# Patient Record
Sex: FEMALE | Race: WHITE
[De-identification: names, ages, dates, MRNs, and addresses within clinical notes are randomized per-mention and may not be internally consistent; named-entity substitution may affect disease eponyms.]

---

## 2021-03-05 ENCOUNTER — HOSPITAL ENCOUNTER (OUTPATIENT)
Dept: HOSPITAL 47 - WWCWWP | Age: 71
End: 2021-03-05
Attending: SURGERY
Payer: MEDICARE

## 2021-03-05 VITALS
RESPIRATION RATE: 16 BRPM | DIASTOLIC BLOOD PRESSURE: 71 MMHG | SYSTOLIC BLOOD PRESSURE: 139 MMHG | HEART RATE: 74 BPM | TEMPERATURE: 98.2 F

## 2021-03-05 DIAGNOSIS — Z17.0: ICD-10-CM

## 2021-03-05 DIAGNOSIS — C50.912: Primary | ICD-10-CM

## 2021-03-05 DIAGNOSIS — Z80.9: ICD-10-CM

## 2021-03-05 NOTE — P.GSHP
History of Present Illness


H&P Date: 03/05/21


Chief Complaint: left breast cancer


    


   Lizzy is a 70 year old white female seen in consultation for DR. Pina 

regarding a left breast cancer.  She felt at lump in her left breast about two 

months ago.  She had a bilateral mammogram on 2-3-21 and this showed a 1.2 Cm 

left breast mass in the inner upper aspect.  An ultrasound was done on dhara same 

date which showed a 2.5 cm mass at 10 O Clock.  A biopsy was done on 2-23-21 

which was an invasive ductal carcinoma, ER/NY positive, HER-2 negative.


    It has not changed in size since she noted it.  In 2017 she had a stero 

biopsy done of the left breast which was negative for cancer.  She also had a 

left breast open biopsy in her 30's which was negative for cancer. She has had 

no surgery on her right breast.  Prior to the biopsy she had some minimal 

discomfort at the site of the nodularity, since the biopsy she has some 

ecchymosis in the breast.  She did have swelling also after the biopsy which has

decreased in size.  The biopsy was done and [id number].  Does not have any nipple 

discharge or skin changes.





Caffeine:none


nicotine: none


arron-bromine: occasional


hormones: none





Family History: 


mother: lung cancer smoker


paternal aunt: ? type


father: prostate cancer


brother: prostate ancer





Hormonal History:


menarche: 12


G0


menopause: 49


BCP: none


hormones: none








Surgical History:


bresat biopsy





Medical History:


shingles


bladder infection





Social History:


smoke: none


alcohol: none


drugs: none





 











- Constitutional


Constitutional: Denies chills, Denies fever





- EENT


Comment: 





wears glasses


Ears: bilateral: tinnitus


Ears, nose, mouth and throat: Denies headache, Denies sore throat





- Breasts


Breasts: bilateral: as per HPI





- Cardiovascular


Cardiovascular: Denies chest pain, Denies shortness of breath





- Respiratory


Respiratory: Denies cough, Denies 7





- Gastrointestinal


Gastrointestinal: Denies abdominal pain, Denies diarrhea, Denies nausea, Denies 

vomiting





- Genitourinary (Female)


Comment: 





UTI in recent past


Genitourinary: Denies dysuria, Denies hematuria





- Menstruation


Menstruation: Reports postmenopausal





- Musculoskeletal


Comment: 





arthritis in hands





- Integumentary


Integumentary: Denies pruritus, Denies rash





- Neurological


Neurological: Denies numbness, Denies weakness





- Psychiatric


Psychiatric: Reports anxiety





- Endocrine


Endocrine: Denies fatigue, Denies weight change





- Hematologic/Lymphatic


Comment: 





low dose aspirin and vitamin E





- Allergic/Immunologic


Allergic/Immunologic: Reports as per HPI





Medications and Allergies


                                Home Medications











 Medication  Instructions  Recorded  Confirmed  Type


 


Ascorbic Acid [Vitamin C] 1,000 mg PO BID 03/05/21 03/05/21 History


 


Aspirin [Adult Low Dose Aspirin EC] 81 mg PO QAM 03/05/21 03/05/21 History


 


Cholecalciferol [Vitamin D3 (25 25 mcg PO QAM 03/05/21 03/05/21 History





Mcg = 1000 Iu)]    


 


Cyanocobalamin/Cobamamide [Vitamin 1 each SL DAILY 03/05/21 03/05/21 History





B-12 5,000 Mcg Tab Sl]    


 


Vitamin E 1,000 unit PO QAM 03/05/21 03/05/21 History








                                    Allergies











Allergy/AdvReac Type Severity Reaction Status Date / Time


 


Sulfa (Sulfonamide Allergy  Abdominal Unverified 03/05/21 07:33





Antibiotics)   Pain  














Surgical - Exam





BMI 22.2





- General


well developed, well nourished, no distress





- Eyes


normal ocular movement





- ENT


normal pinna, normal nares





- Neck


no masses, trachea midline





- Respiratory


normal respiratory effort, clear to auscultation





- Cardiovascular


Rhythm: regular


Heart Sounds: normal: S1, S2





- Abdomen


Abdomen: soft, bowel sounds





- Integumentary





normal turgor





- Neurologic


no disoriented, no combative





- Musculoskeletal


normal gait





- Psychiatric


oriented to time, oriented to person, oriented to place, speech is normal, 

memory intact





breast exam:


BRA: 34B


inspection: bilateral grade 2/3 ptosis


palpation: 


Right breast: Multiple positional exam dense fibroglandular tissue fibrocystic 

disease no dominant masses or nodules of concern


Right axilla: No adenopathy of concern


Left breast: Multiple positional exam increased fullness at the 10 o'clock 

position approximately 2 cm in size, ecchymosis at site of biopsy, no evidence 

of infection, fibrocystic changes, no dominant masses or nodules of concern


Left axilla: No adenopathy of concern





Results





Mammogram and ultrasound xrays examined with DR. Burleson





REview of medical records from Dr. Pina





Assessment and Plan


Assessment: 





Impression:


1.  [id number]+Her2-G2 left breast cancer


2.  Very dense bilateral breast on mammogram


3.  Abnormal left breast ultrasound


4.  Family history of cancer





Plan:


1. Breast MRI secondary to dense breast and unable to see lesion on mammogram in

the left breast


2.  presentation of case at tumor board


3. appointment with medical oncology


4.  follow up after MRI





CC: Dr. Pina





We have discussed the stage of the tumor which is a 1B, we have discussed 

treatment options of both the breast and the axilla.  At this time the patient 

would prefer to have a lumpectomy if possible.  She will be seen again following

breast MRI and presentation of her case at tumor board.  If possible I would 

like medical oncology to shrink the tumor prior to a lumpectomy secondary to the

size of the tumor relative to her breast.





Encounter 60 minutes, time spent in examination, review of records, and 

counselling.

## 2021-03-08 ENCOUNTER — HOSPITAL ENCOUNTER (OUTPATIENT)
Dept: HOSPITAL 47 - RADMRIMAIN | Age: 71
Discharge: HOME | End: 2021-03-08
Attending: SURGERY
Payer: MEDICARE

## 2021-03-08 DIAGNOSIS — C50.212: Primary | ICD-10-CM

## 2021-03-08 PROCEDURE — 77049 MRI BREAST C-+ W/CAD BI: CPT

## 2021-03-09 NOTE — BMR
EXAMINATION TYPE: MR breast BILAT wo/w con

 

DATE OF EXAM: 3/8/2021

 

COMPARISON: Outside bilateral diagnostic 3-D mammogram February 3, 2021 BI-RADS 0. Outside left breas
t ultrasound February 3, 2021 BI-RADS 4.

 

HISTORY: Upper-inner quadrant of LT side-lump/mass, invasive ductal carcinoma on ultrasound-guided bi
opsy February 16, 2021.

 

TECHNIQUE: A series of fat and water weighted images in the long and short axis views of both breasts
 are obtained in conjunction with dynamic contrast MRI with subtraction technique.  The patient was i
njected with 5.0 mL intravenous Gadavist gadolinium contrast.   Three-dimensional and additional post
processing imaging is created on independent workstation and reviewed during official interpretation 
of this study.  

 

 

FINDINGS: Extremely dense fibroglandular tissue is redemonstrated throughout both breasts. T2 and STI
R weighted images show no significant cystic change in either breast. There are symmetric prominent b
ut subcentimeter benign-appearing bilateral axillary lymph nodes present on T1 nonfat saturated image
s. Dynamic postcontrast imaging shows mild background enhancement. Delayed dynamic postcontrast imagi
ng shows no suspicious internal mammary adenopathy.

 

With regards to the right breast no suspicious skin thickening is seen. No pathologic enhancement or 
enhancing masses noted. The chest wall is intact.

 

With regards to the left breast corresponding to mammogram and ultrasound and palpable abnormality th
ere is 1.4 x 1.1 x 1.7 cm enhancing lesion subtraction series 701 image 428 and sagittal postcontrast
 series 704 image 273 at 10:00 position posterior depth with artifact from biopsy clip along the medi
al anterior aspect that corresponds to recent biopsy-proven invasive ductal carcinoma. Computer measu
rements 1.6 x 1.4 x 2.0 cm correlates slightly more accurate with recent ultrasound. 

 

There are some punctate areas of susceptibility artifact in the anterior outer aspect of left breast 
near nipple. There is additional more focal area of susceptibility artifact between 11 and 12:00 posi
tion middle depth upper aspect left breast image 523 series 701. Dynamic postcontrast imaging shows s
ome heterogeneous vague enhancement at this level. No definitive measurable lesion. More rim type enh
ancement seen on delayed images measuring 1.2 x 0.9 cm image 21 series 801. I do not see correspondin
g large dystrophic calcification on mammogram. Area of uncertain clinical significance. I would advis
e targeted ultrasound evaluation, if negative would presume benign.

 

No abnormal skin thickening is present. Chest wall is intact.

 

Small size hiatal hernia is incidentally appreciated.

 

IMPRESSION: Biopsy-proven malignancy left breast. Second area of concern left breast as noted above w
arrants ultrasound evaluation. No MRI evidence for invasive malignancy right breast. No abnormal johnny
opathy clearly identified.

 

BI-RADS 2 benign findings right breast

BI-RADS 6 biopsy-proven cancer left breast.

 

Recommendation: Ultrasound evaluation B zone 11 to 12:00 position to rule out second lesion, if negat
markos can proceed with surgical management of single biopsy-proven malignancy left breast.

## 2021-03-19 ENCOUNTER — HOSPITAL ENCOUNTER (OUTPATIENT)
Dept: HOSPITAL 47 - RADUSWWP | Age: 71
Discharge: HOME | End: 2021-03-19
Attending: SURGERY
Payer: MEDICARE

## 2021-03-19 DIAGNOSIS — C50.212: Primary | ICD-10-CM

## 2021-03-22 NOTE — USB
Reason for exam: additional evaluation requested from prior study.



History:

Patient has history of breast cancer at age 70.

Malignant ultrasound-guided core biopsy of the left breast, February 2021.



Physical Findings:

Nurse Summary: 1.5cm firm, fixed lump left breast 10 o'clock, bruising left lower 

inner quadrant from biopsy (nurse mj).



US Breast Limited LT

Left limited breast ultrasound including focal area of concern, retroareolar and 

axilla demonstrates a 0.5 x 0.3 x 0.8cm oval, hypoechoic to anechoic lesion at 11 

o'clock and a 0.9 x 0.7 x 1.2cm oval, solid lesion at 11 o'clock. 10 o'clock 

biopsy proved cancer.



These results were verbally communicated with the patient and result sheet given 

to the patient on 3/19/21.





ASSESSMENT: Suspicious, BI-RAD 4



RECOMMENDATION:

Ultrasound core biopsy of the left breast.



Called office with mammographic findings and has scheduled an appointment for the 

patient for 4/16/21 at 11:20 with Dr. Berry.

Biopsy scheduled for 4/1/21 at 1:00.



PRELIMINARY REPORT CALLED AND FAXED TO DR. BERRY ON 3/22/21.

## 2021-04-01 ENCOUNTER — HOSPITAL ENCOUNTER (OUTPATIENT)
Dept: HOSPITAL 47 - RADUSWWP | Age: 71
End: 2021-04-01
Attending: SURGERY
Payer: MEDICARE

## 2021-04-01 VITALS — TEMPERATURE: 98.2 F | DIASTOLIC BLOOD PRESSURE: 68 MMHG | SYSTOLIC BLOOD PRESSURE: 137 MMHG | HEART RATE: 80 BPM

## 2021-04-01 VITALS — RESPIRATION RATE: 16 BRPM

## 2021-04-01 DIAGNOSIS — C50.412: Primary | ICD-10-CM

## 2021-04-01 DIAGNOSIS — Z17.0: ICD-10-CM

## 2021-04-01 PROCEDURE — 88341 IMHCHEM/IMCYTCHM EA ADD ANTB: CPT

## 2021-04-01 PROCEDURE — 77065 DX MAMMO INCL CAD UNI: CPT

## 2021-04-01 PROCEDURE — 88305 TISSUE EXAM BY PATHOLOGIST: CPT

## 2021-04-01 PROCEDURE — 88342 IMHCHEM/IMCYTCHM 1ST ANTB: CPT

## 2021-04-01 PROCEDURE — 19083 BX BREAST 1ST LESION US IMAG: CPT

## 2021-04-01 NOTE — USB
EXAMINATION TYPE: US biopsy breast VAD LT

 

DATE OF EXAM: 4/1/2021

 

CLINICAL HISTORY: C50.412 Malignant neoplasm of left breast.

 

TECHNIQUE: Ultrasound guided vaccuum assisted core biopsy of left breast.  

 

COMPARISON: 3/19/2021

 

FINDINGS: The ultrasound guided core biopsy procedure was explained to the 
patient.  The risks, benefits, alternatives were discussed.  An informed consent
was then obtained.  

 

Timeout was performed.

 

The patient was placed in supine positioning for  imaging and for the procedure.
The overlying skin was prepped with betadine and sterilely draped in usual 
sterile fashion.  Lidocaine 1% was used as anesthetic into the skin and deeper 
breast tissue up to area of concern in the breast.  A small skin nick was made 
with surgical scalpel.  

 

Under ultrasound guidance, a 12-gauge vacuum assisted biopsy device was used to 
obtain 4 core samples.  Celeros was utilized. A biopsy clip was left in lesion. 
Coil clip was placed.

 

Good hemostasis was obtained with direct pressure.  Discharge instructions were 
discussed with the patient.  The patient will follow up with the referring 
physician for results.

 

Postprocedure mammogram: The patient was transferred to mammography for 
physician ordered post procedure mammogram for clip placement verification.  The
clip is in the expected region of the biopsy.

 

The patient tolerated the procedure well without any immediate complication.  
The patient was discharged to home in stable condition.  

 

 

 

IMPRESSION: 

1. Successful ultrasound guided biopsy left breast. 

 

Recommendations:

1. Recommendations are pending pathology results.

 

Pathology Results: Malignant



LEFT BREAST, ELEVEN O'CLOCK, CORE BIOPSY:  Invasive ductal carcinoma, Grade 2 
(see Surgical Pathology Cancer Case Summary and comment).  



Recommendation

Surgical consult of the left breast. 

DOUG

## 2021-04-01 NOTE — MM
EXAMINATION TYPE: US biopsy breast VAD LT

 

DATE OF EXAM: 4/1/2021

 

CLINICAL HISTORY: C50.412 Malignant neoplasm of left breast.

 

TECHNIQUE: Ultrasound guided vaccuum assisted core biopsy of left breast.  

 

COMPARISON: 3/19/2021

 

FINDINGS: The ultrasound guided core biopsy procedure was explained to the patient.  The risks, benef
its, alternatives were discussed.  An informed consent was then obtained.  

 

Timeout was performed.

 

The patient was placed in supine positioning for  imaging and for the procedure. The overlying skin w
as prepped with betadine and sterilely draped in usual sterile fashion.  Lidocaine 1% was used as ane
sthetic into the skin and deeper breast tissue up to area of concern in the breast.  A small skin gildardo
k was made with surgical scalpel.  

 

Under ultrasound guidance, a 12-gauge vacuum assisted biopsy device was used to obtain 4 core samples
.  Celeros was utilized. A biopsy clip was left in lesion.  Coil clip was placed.

 

Good hemostasis was obtained with direct pressure.  Discharge instructions were discussed with the pa
kiana.  The patient will follow up with the referring physician for results.

 

Postprocedure mammogram: The patient was transferred to mammography for physician ordered post proced
ure mammogram for clip placement verification.  The clip is in the expected region of the biopsy.

 

The patient tolerated the procedure well without any immediate complication.  The patient was dischar
ged to home in stable condition.  

 

 

 

IMPRESSION: 

1. Successful ultrasound guided biopsy left breast. 

 

Recommendations:

1. Recommendations are pending pathology results.

## 2021-04-16 ENCOUNTER — HOSPITAL ENCOUNTER (OUTPATIENT)
Dept: HOSPITAL 47 - WWCWWP | Age: 71
End: 2021-04-16
Attending: SURGERY
Payer: MEDICARE

## 2021-04-16 VITALS
HEART RATE: 65 BPM | DIASTOLIC BLOOD PRESSURE: 74 MMHG | TEMPERATURE: 97.7 F | SYSTOLIC BLOOD PRESSURE: 147 MMHG | RESPIRATION RATE: 18 BRPM

## 2021-04-16 DIAGNOSIS — F41.9: ICD-10-CM

## 2021-04-16 DIAGNOSIS — C50.212: Primary | ICD-10-CM

## 2021-04-16 NOTE — P.PN
Subjective


Progress Note Date: 04/16/21


Principal diagnosis: 





left breast cancer; multifocal disease; [id number]+Her2-G2


  Lizzy is a 70 year old white female seen in consultation for DR. Pina in 

march regarding a left breast cancer.  She felt at lump in her left breast about

two months prior to that.  She had a bilateral mammogram on 2-3-21 and this 

showed a 1.2 cm left breast mass in the inner upper aspect.  An ultrasound was 

done on the same date which showed a 2.5 cm mass at 10 O Clock.  A biopsy was 

done on 2-23-21 which was an invasive ductal carcinoma, ER/CA positive, HER-2 

negative.


    It has not changed in size since she noted it.  In 2017 she had a stero 

biopsy done of the left breast which was negative for cancer.  She also had a 

left breast open biopsy in her 30's which was negative for cancer. She has had 

no surgery on her right breast.  Prior to the biopsy she had some minimal 

discomfort at the site of the nodularity, since the biopsy she has some 

ecchymosis in the breast.  She did have swelling also after the biopsy which has

decreased in size.  The biopsy was done on [address].  She did not have any nipple

discharge or skin changes.





A breast MRI was done on 3-8-21.  With regards to the right breast pathologic 

enhancement or enhancing masses were noted.  With respect to the left breast a 

1.4 x 1.7 cm enhancing lesion was noted at the 10 o'clock position posterior 

depth which had been biopsied prior was proven to be an invasive ductal 

carcinoma.


There also noted to be some punctate areas of susceptibility artifact in the 

anterior outer aspect of the left breast.  Additionally a 1.2 cm lesion was 

noted for which target ultrasound was recommended and biopsy recommended in the 

11 to 12 o'clock position.  The patient did undergo ultra sound for biopsy of 

the area of concern noted on MRI and 4121.  This was positive for invasive 

ductal carcinoma as well. She has no complaints related to this. 


     Had an appetite performed on the tumor and the Oncotype results were 28.





She has been seen by medical oncology and the notes are reviewed; and she is 

going to undergo neoadjuvant chemotherapy.  She is going to undergo TC.  She 

will like to wait until after school is over and will start this the second week

of May.  She will have TC every 3 weeks for 4 cycles and nulesta after each 

cycle.





Caffeine:none


nicotine: none


arron-bromine: occasional


hormones: none





Family History: 


mother: lung cancer smoker


paternal aunt: ? type


father: prostate cancer


brother: prostate ancer





Hormonal History:


menarche: 12


G0


menopause: 49


BCP: none


hormones: none








Surgical History:


bresat biopsy





Medical History:


shingles


bladder infection





Social History:


smoke: none


alcohol: none


drugs: none





 











- Constitutional


Constitutional: Denies chills, Denies fever





- EENT


Comment: 





wears glasses


Ears: bilateral: tinnitus


Ears, nose, mouth and throat: Denies headache, Denies sore throat





- Breasts


Breasts: bilateral: as per HPI





- Cardiovascular


Cardiovascular: Denies chest pain, Denies shortness of breath





- Respiratory


Respiratory: Denies cough





- Gastrointestinal


Gastrointestinal: Denies abdominal pain, Denies diarrhea, Denies nausea, Denies 

vomiting





- Genitourinary (Female)


Comment: 





UTI in recent past


Genitourinary: Denies dysuria, Denies hematuria





- Menstruation


Menstruation: Reports postmenopausal





- Musculoskeletal


Comment: 





arthritis in hands





- Integumentary


Integumentary: Denies pruritus, Denies rash





- Neurological


Neurological: Denies numbness, Denies weakness





- Psychiatric


Psychiatric: Reports anxiety





- Endocrine


Endocrine: Denies fatigue, Denies weight change





- Hematologic/Lymphatic


Comment: 





low dose aspirin and vitamin E





- Allergic/Immunologic


Allergic/Immunologic: Reports as per HPI








Objective





- Vital Signs


Vital signs: 


                                   Vital Signs











Temp  97.7 F   04/16/21 11:29


 


Pulse  65   04/16/21 11:29


 


Resp  18   04/16/21 11:29


 


BP  147/74   04/16/21 11:29


 


Pulse Ox  100   04/16/21 11:29








                                 Intake & Output











 04/15/21 04/16/21 04/16/21





 18:59 06:59 18:59


 


Weight   47.627 kg














- Constitutional


General appearance: Present: average body habitus





- EENT


Eyes: Present: EOMI


ENT: Present: hearing grossly normal





- Neck


Neck: Present: normal ROM





- Respiratory


Respiratory: bilateral: CTA





- Cardiovascular


Rhythm: regular


Heart sounds: normal: S1, S2





- Integumentary


Integumentary Comment(s): 





Achymosis lateral aspect of the left breast, small hematoma near biopsy site, no

evidence of infection


Integumentary: Present: normal turgor





- Psychiatric


Psychiatric: Present: A&O x's 3





Assessment and Plan


Assessment: 





Impression:


1.  left breast multifocal cancer


2. patient to have pre-op chemotherpay





Plan:


1. pre-op chemotherapy


2. follow up in 1 1/2 months





CC: Dr. Pina

## 2021-06-18 ENCOUNTER — HOSPITAL ENCOUNTER (OUTPATIENT)
Dept: HOSPITAL 47 - WWCWWP | Age: 71
End: 2021-06-18
Attending: SURGERY
Payer: MEDICARE

## 2021-06-18 VITALS
HEART RATE: 76 BPM | SYSTOLIC BLOOD PRESSURE: 150 MMHG | DIASTOLIC BLOOD PRESSURE: 71 MMHG | RESPIRATION RATE: 18 BRPM | TEMPERATURE: 98.4 F

## 2021-06-18 DIAGNOSIS — Z88.2: ICD-10-CM

## 2021-06-18 DIAGNOSIS — C50.912: Primary | ICD-10-CM

## 2021-06-18 DIAGNOSIS — Z17.0: ICD-10-CM

## 2021-06-18 NOTE — P.PN
Subjective


Progress Note Date: 06/18/21


Principal diagnosis: 





invasive ductal left breast cancer


left breast cancer; multifocal disease; [id number]+AL+Her2-G2


  Lizzy is a 71 year old white female seen in consultation for DR. Pina in 

march regarding a left breast cancer.  She felt at lump in her left breast about

two months prior to that.  She had a bilateral mammogram on 2-3-21 and this 

showed a 1.2 cm left breast mass in the inner upper aspect.  An ultrasound was 

done on the same date which showed a 2.5 cm mass at 10 O Clock.  A biopsy was 

done on 2-23-21 which was an invasive ductal carcinoma, ER/AL positive, HER-2 

negative.


     In 2017 she had a stero biopsy done of the left breast which was negative 

for cancer.  She also had a left breast open biopsy in her 30's which was 

negative for cancer. She has had no surgery on her right breast.  Prior to the 

biopsy she had some minimal discomfort at the site of the nodularity, since the 

biopsy she has some ecchymosis in the breast.  She did have swelling also after 

the biopsy which has decreased in size.  [place] on [address].  She 

did not have any nipple discharge or skin changes.





A breast MRI was done on 3-8-21.  With regards to the right breast pathologic 

enhancement or enhancing masses were noted.  With respect to the left breast a 

1.4 x 1.7 cm enhancing lesion was noted at the 10 o'clock position posterior 

depth which had been biopsied prior was proven to be an invasive ductal 

carcinoma.


There also noted to be some punctate areas of susceptibility artifact in the 

anterior outer aspect of the left breast.  Additionally a 1.2 cm lesion was 

noted for which target ultrasound was recommended and biopsy recommended in the 

11 to 12 o'clock position.  The patient did undergo ultra sound for biopsy of 

the area of concern noted on MRI and 4121.  This was positive for invasive 

ductal carcinoma as well. She has no complaints related to this. 


     She had an oncotype  performed on the tumor and the Oncotype results were 

28.





She has been seen by medical oncology and the notes are reviewed; and she is 

going to undergo neoadjuvant chemotherapy.  She is going to undergo TC.  She 

will like to wait until after school is over and will start this the second week

of May.  She will have TC every 3 weeks for 4 cycles and nulesta after each 

cycle.





The patient states the Dr. Khan has noticed that the tumors are shrinking.  She

has 2 more courses of chemotherapy.  She [place] on TC on 49[address].  She also

developed thrush on the treatment which has resolved at this time. 





[place] [id number] reviewed





Caffeine:none


nicotine: none


arron-bromine: occasional


hormones: none





Family History: 


mother: lung cancer smoker


paternal aunt: ? type


father: prostate cancer


brother: prostate ancer





Hormonal History:


menarche: 12


G0


menopause: 49


BCP: none


hormones: none








Surgical History:


breast biopsy





Medical History:


shingles


bladder infection





Social History:


smoke: none


alcohol: none


drugs: none





 











- Constitutional


Constitutional: Denies chills, Denies fever





- EENT


Comment: 





wears glasses


Ears: bilateral: tinnitus


Ears, nose, mouth and throat: Denies headache, Denies sore throat





- Breasts


Breasts: bilateral: as per HPI





- Cardiovascular


Cardiovascular: Denies chest pain, Denies shortness of breath





- Respiratory


Respiratory: Denies cough





- Gastrointestinal


Gastrointestinal: Denies abdominal pain, Denies diarrhea, Denies nausea, Denies 

vomiting





- Genitourinary (Female)


Comment: 





UTI in recent past


Genitourinary: Denies dysuria, Denies hematuria





- Menstruation


Menstruation: Reports postmenopausal





- Musculoskeletal


Comment: 





arthritis in hands





- Integumentary


Integumentary: Denies pruritus, Denies rash





- Neurological


Neurological: Denies numbness, Denies weakness





- Psychiatric


Psychiatric: Reports anxiety





- Endocrine


Endocrine: Denies fatigue, Denies weight change





- Hematologic/Lymphatic


Comment: 





low dose aspirin and vitamin E





- Allergic/Immunologic


Allergic/Immunologic: Reports as per HPI














Objective





- Vital Signs


Vital signs: 


                                 Intake & Output











 06/17/21 06/18/21 06/18/21





 18:59 06:59 18:59


 


Weight   48.988 kg














- Exam





BMI 23.4





- Constitutional


General appearance: Present: average body habitus





- EENT


Eyes: Present: EOMI


ENT: Present: hearing grossly normal





- Neck


Neck: Present: normal ROM





- Respiratory


Respiratory: bilateral: CTA





- Cardiovascular


Rhythm: regular


Heart sounds: normal: S1, S2





- Gastrointestinal


General gastrointestinal: Present: soft





- Integumentary


Integumentary: Present: normal turgor





- Musculoskeletal


Musculoskeletal: Present: gait normal





- Psychiatric


Psychiatric: Present: A&O x's 3, appropriate affect, intact judgment & insight





- Additional findings


Additional findings: 





Breast Exam:


Bra: 36B


inspection: bilateral grade 2/3 ptosis


palpation:


right breast: Multi-positional exam fibrocystic changes, no dominant masses or 

nodules of concern


Right axilla: No adenopathy of concern


Left breast: Mass 12:00 in the upper outer quadrant no longer feels attached to 

the pectoralis muscle appears to be smaller in size, no other dominant masses or

nodules of concern


Left axilla: No adenopathy of concern





Assessment and Plan


Assessment: 





Impression:


1.  left breast multifocal cancer/ [id number]+AL+Her2-G2





Plan:


1.  Patient is scheduled for a left breast mastectomy on 8 2421


2.  Medical clearance from Dr. Pina


3.  Patient to follow up here 8-12-21


4.  Complete chemotherapy


5.  Follow up sooner if any questions or concerns





Cc: Dr. Pina

## 2021-08-04 ENCOUNTER — HOSPITAL ENCOUNTER (OUTPATIENT)
Dept: HOSPITAL 47 - RADMAMWWP | Age: 71
Discharge: HOME | End: 2021-08-04
Attending: SURGERY
Payer: MEDICARE

## 2021-08-04 DIAGNOSIS — C50.212: Primary | ICD-10-CM

## 2021-08-04 PROCEDURE — 77061 BREAST TOMOSYNTHESIS UNI: CPT

## 2021-08-04 PROCEDURE — 77065 DX MAMMO INCL CAD UNI: CPT

## 2021-08-04 PROCEDURE — 76641 ULTRASOUND BREAST COMPLETE: CPT

## 2021-08-10 NOTE — MM
Reason for exam: follow-up at short interval from prior study.

Last mammogram was performed 4 months ago.



History:

Patient has history of breast cancer at age 70, history of other cancer, and is 

nulliparous.

Malignant US biopsy breast VAD LT of the left breast, April 1, 2021.  Malignant 

ultrasound-guided core biopsy of the left breast, February 2021.  Benign 

excisional biopsy of the right breast, 2017.  Benign excisional biopsy of the left

breast.



Physical Findings:

Nurse Summary: 2cm nodule in the left breast at 9 o'clock and 11 o'clock (nurse 

db).



MG 3D Diag Mammo W/Cad LT

CC, MLO, and XCCL view(s) were taken of the left breast.

Prior study comparison: April 1, 2021, left breast MG diagnostic mammo LT wo CAD. 

February 16, 2021, mammogram.  February 3, 2021, mammogram.

The breast tissue is extremely dense which could obscure a lesion on mammography. 

Previous mammotome biopsy in the left breast x 2. Patient's known cancers x 2 not

identified.

No significant new findings when compared with previous films.



These results were verbally communicated with the patient and result sheet given 

to the patient on 8/4/21.





ASSESSMENT: Incomplete: need additional imaging evaluation, BI-RAD 0



RECOMMENDATION:

Ultrasound of the left breast.

Manage patient on a clinical basis.

## 2021-08-10 NOTE — USB
Reason for exam: follow-up at short interval from prior study.



History:

Patient has history of breast cancer at age 70, history of other cancer, and is 

nulliparous.

Malignant US biopsy breast VAD LT of the left breast, April 1, 2021.  Malignant 

ultrasound-guided core biopsy of the left breast, February 2021.  Benign 

excisional biopsy of the right breast, 2017.  Benign excisional biopsy of the left

breast.



US Breast LT

Left complete breast ultrasound includes all four quadrants, the retroareolar 

region and axilla. Finding demonstrates a 0.6 x 0.6 x 0.8cm known cancer, coil 

clip at 10 o'clock. 11 o'clock area not identified.



These results were verbally communicated with the patient and result sheet given 

to the patient on 8/4/21.





ASSESSMENT: Suspicious, BI-RAD 4



RECOMMENDATION:

Surgical consultation of the left breast.

(known cancer x 2)



Called office with mammographic findings and has scheduled an appointment for the 

patient for 8/12/21 at 11:40 with Dr. Berry.

Biopsy scheduled for 8/17/21 at 10:30.



PRELIMINARY REPORT CALLED AND FAXED TO DR. BERRY ON 8/10/21.

## 2021-08-12 ENCOUNTER — HOSPITAL ENCOUNTER (OUTPATIENT)
Dept: HOSPITAL 47 - WWCWWP | Age: 71
Discharge: HOME | End: 2021-08-12
Attending: SURGERY
Payer: MEDICARE

## 2021-08-12 VITALS
HEART RATE: 74 BPM | RESPIRATION RATE: 14 BRPM | SYSTOLIC BLOOD PRESSURE: 140 MMHG | TEMPERATURE: 98.2 F | DIASTOLIC BLOOD PRESSURE: 58 MMHG

## 2021-08-12 DIAGNOSIS — Z53.9: Primary | ICD-10-CM

## 2021-08-12 NOTE — P.PN
Subjective


Progress Note Date: 08/12/21


Principal diagnosis: 





stage 1A left breast cancer


invasive ductal left breast cancer


left breast cancer; multifocal disease; [id number]+IA+Her2-G2


  Lizzy is a 71 year old white female seen in consultation for DR. Pina in 

march regarding a left breast cancer.  She felt at lump in her left breast about

two months prior to that.  She had a bilateral mammogram on 2-3-21 and this 

showed a 1.2 cm left breast mass in the inner upper aspect.  An ultrasound was 

done on the same date which showed a 2.5 cm mass at 10 O Clock.  A biopsy was 

done on 2-23-21 which was an invasive ductal carcinoma, ER/IA positive, HER-2 

negative.


     In 2017 she had a stero biopsy done of the left breast which was negative 

for cancer.  She also had a left breast open biopsy in her 30's which was 

negative for cancer. She has had no surgery on her right breast.  Prior to the 

biopsy she had some minimal discomfort at the site of the nodularity, since the 

biopsy she has some ecchymosis in the breast.  She did have swelling also after 

the biopsy which has decreased in size.  [place] on [address].  She 

did not have any nipple discharge or skin changes.





A breast MRI was done on 3-8-21.  With regards to the right breast no pathologic

enhancement or enhancing masses were noted.  With respect to the left breast a 

1.4 x 1.7 cm enhancing lesion was noted at the 10 o'clock position posterior 

depth which had been biopsied prior was proven to be an invasive ductal 

carcinoma.


There also noted to be some punctate areas of susceptibility artifact in the 

anterior outer aspect of the left breast.  Additionally a 1.2 cm lesion was 

noted for which target ultrasound was recommended and biopsy recommended in the 

11 to 12 o'clock position.  The patient did undergo ultra sound for biopsy of 

the area of concern noted on MRI and 4121.  This was positive for invasive 

ductal carcinoma as well. She has no complaints related to this. 


     She had an oncotype  performed on the tumor and the Oncotype results were 

28.





She has been seen by medical oncology and the notes are reviewed; and she is 

going to undergo neoadjuvant chemotherapy.  She is going to undergo TC.  She 

will like to wait until after school is over and will start this the second week

of May.  She will have TC every 3 weeks for 4 cycles and nulesta after each 

cycle.





The patient states the Dr. Khan has noticed that the tumors are shrinking.  She

has 2 more courses of chemotherapy.  She was started on TC on 51021.  She also

developed thrush on the treatment which has resolved at this time. 





Note from Dr. Khan on 7-28-21 reviewed; at that time there was some concern 

that the patient may also have a lymphoblastic lymphoma versus a Walderstrom 

macroglobulinemia, this was secondary to significantly elevated IgM Monoclonal 

protein. 





The patient's last chemotherapy infusion was on July 12.





Hemoglobin: 10, white blood cell count: 12.7, platelet count 236 this is blood 

work from [address]4048





 On 8-5-21 noted a swelling about the gumline on the right side.  This became 

progressively swollen including her cheek and/that she could not open her eyes 

to see.  She was seen by the dentist on 8921 and given a prescription for 

Augmentin. She had a partial cleaning that day as well.  She was told she has an

abscess.  She continues to have swelling of the right side of her face however 

this has decreased.  She is presently on antibiotic therapy this will not be 

completed and tell Wednesday of next week.





She did have a [place] breast [place] 8[address] which revealed a 0.6 

x 0.8 known carcinoma at the 10 o'clock position 11:00 area was not noted; it 

was felt that the tumor had decreased in size secondary to the neoadjuvant 

chemotherapy.  She also had a left breast mammogram done showing the breast 

tissue to be extremely dense.

















Caffeine:none


nicotine: none


arron-bromine: occasional


hormones: none





Family History: 


mother: lung cancer smoker


paternal aunt: ? type


father: prostate cancer


brother: prostate ancer





Hormonal History:


menarche: 12


G0


menopause: 49


BCP: none


hormones: none








Surgical History:


breast biopsy





Medical History:


shingles


bladder infection





Social History:


smoke: none


alcohol: none


drugs: none





 











- Constitutional


Constitutional: Denies chills, Denies fever





- EENT


Comment: 





wears glasses


Ears: bilateral: tinnitus


Ears, nose, mouth and throat: Denies headache, Denies sore throat





- Breasts


Breasts: bilateral: as per HPI





- Cardiovascular


Cardiovascular: Denies chest pain, Denies shortness of breath





- Respiratory


Respiratory: Denies cough





- Gastrointestinal


Gastrointestinal: Denies abdominal pain, Denies diarrhea, Denies nausea, Denies 

vomiting





- Genitourinary (Female)


Comment: 





UTI in recent past


Genitourinary: Denies dysuria, Denies hematuria





- Menstruation


Menstruation: Reports postmenopausal





- Musculoskeletal


Comment: 





arthritis in hands





- Integumentary


Integumentary: Denies pruritus, Denies rash





- Neurological


Neurological: Denies numbness, Denies weakness





- Psychiatric


Psychiatric: Reports anxiety





- Endocrine


Endocrine: Denies fatigue, Denies weight change





- Hematologic/Lymphatic


Comment: 





low dose aspirin and vitamin E





- Allergic/Immunologic


Allergic/Immunologic: Reports as per HPI














Objective





- Vital Signs


Vital signs: 


                                   Vital Signs











Temp  98.2 F   08/12/21 11:56


 


Pulse  74   08/12/21 11:56


 


Resp  14   08/12/21 11:56


 


BP  140/58   08/12/21 11:56


 


Pulse Ox  97   08/12/21 11:56








                                 Intake & Output











 08/11/21 08/12/21 08/12/21





 18:59 06:59 18:59


 


Weight   48.988 kg














- Constitutional


General appearance: Present: cooperative





- EENT


ENT: Present: hearing grossly normal





- Respiratory


Respiratory: bilateral: CTA





- Cardiovascular


Heart sounds: normal: S1, S2





- Integumentary


Integumentary Comment(s): 





swollen right cheek/tender 





- Musculoskeletal


Musculoskeletal: Present: gait normal





- Psychiatric


Psychiatric: Present: A&O x's 3, appropriate affect, intact judgment & insight





Assessment and Plan


Assessment: 





Impression:


shingles


bladder infection


left breast multifocal cancer/completed neoadjuvant chemotherapy


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


Reason abscess right to





Plan:


1.  discussed the case with her dentist he is going to see her next week she is 

going to complete her antibiotic course and there is a question whether the 

tooth will need to be pulled or not


2.  Left mastectomy with sentinel node biopsy, sentinel node mapping left side, 

possible axillary node dissection


3.  Secondary to the abscess in her tooth the surgery will be postponed





Cc: Dr. Pina

## 2021-08-27 ENCOUNTER — HOSPITAL ENCOUNTER (OUTPATIENT)
Dept: HOSPITAL 47 - WWCWWP | Age: 71
Discharge: HOME | End: 2021-08-27
Attending: SURGERY
Payer: MEDICARE

## 2021-08-27 VITALS
SYSTOLIC BLOOD PRESSURE: 112 MMHG | TEMPERATURE: 98.4 F | HEART RATE: 65 BPM | DIASTOLIC BLOOD PRESSURE: 65 MMHG | RESPIRATION RATE: 18 BRPM

## 2021-08-27 DIAGNOSIS — Z53.9: Primary | ICD-10-CM

## 2021-08-27 NOTE — P.PN
Progress Note - Text


Progress Note Date: 08/27/21





     Lizzy had some questions regarding a contralateral prophylactic 

mastectomy.  I have gone over the risks and benefits with her and her friend.  

At this time I do not see that there is a survival benefit but there is an 

increased risk of postoperative complications.  After thorough discussion 

lasting approximately 20 minutes she has decided to forgo the contralateral 

mastectomy.

## 2021-09-07 ENCOUNTER — HOSPITAL ENCOUNTER (OUTPATIENT)
Dept: HOSPITAL 47 - OR | Age: 71
Setting detail: OBSERVATION
LOS: 2 days | Discharge: HOME HEALTH | End: 2021-09-09
Attending: SURGERY | Admitting: SURGERY
Payer: MEDICARE

## 2021-09-07 VITALS — BODY MASS INDEX: 22.1 KG/M2

## 2021-09-07 DIAGNOSIS — M81.0: ICD-10-CM

## 2021-09-07 DIAGNOSIS — L65.9: ICD-10-CM

## 2021-09-07 DIAGNOSIS — G89.29: ICD-10-CM

## 2021-09-07 DIAGNOSIS — C50.912: Primary | ICD-10-CM

## 2021-09-07 DIAGNOSIS — M19.91: ICD-10-CM

## 2021-09-07 DIAGNOSIS — M54.5: ICD-10-CM

## 2021-09-07 DIAGNOSIS — D62: ICD-10-CM

## 2021-09-07 DIAGNOSIS — R11.0: ICD-10-CM

## 2021-09-07 PROCEDURE — 85025 COMPLETE CBC W/AUTO DIFF WBC: CPT

## 2021-09-07 PROCEDURE — 38792 RA TRACER ID OF SENTINL NODE: CPT

## 2021-09-07 PROCEDURE — 88341 IMHCHEM/IMCYTCHM EA ADD ANTB: CPT

## 2021-09-07 PROCEDURE — 19307 MAST MOD RAD: CPT

## 2021-09-07 PROCEDURE — 38525 BIOPSY/REMOVAL LYMPH NODES: CPT

## 2021-09-07 PROCEDURE — 88307 TISSUE EXAM BY PATHOLOGIST: CPT

## 2021-09-07 PROCEDURE — 88309 TISSUE EXAM BY PATHOLOGIST: CPT

## 2021-09-07 PROCEDURE — 88342 IMHCHEM/IMCYTCHM 1ST ANTB: CPT

## 2021-09-07 PROCEDURE — 88331 PATH CONSLTJ SURG 1 BLK 1SPC: CPT

## 2021-09-07 RX ADMIN — HEPARIN SODIUM SCH: 5000 INJECTION INTRAVENOUS; SUBCUTANEOUS at 21:15

## 2021-09-07 RX ADMIN — POTASSIUM CHLORIDE SCH: 14.9 INJECTION, SOLUTION INTRAVENOUS at 22:44

## 2021-09-07 NOTE — P.CONS
History of Present Illness





- Reason for Consult


Consult date: 09/07/21


Medical management


Requesting physician: Lore Berry





- Chief Complaint


Breast surgery





- History of Present Illness





Consultation:


This is a pleasant 71-year-old patient who follows that Dr. SANTHOSH Pina.  Patient was

diagnosed with multifocal left breast invasive ductal carcinoma.  Patient 

decided to go for left mastectomy.  She underwent neoadjuvant chemotherapy.  Masoud bruno underwent left mastectomy sentinel node mapping and sentinel node biopsy 

resection of superficial axillary tissue.  She has some pain at the operative 

site.  Also some nausea earlier.  On clear liquids.  Patient is arthritic pain 

in the joints.  Has decreased scalp area from chemotherapy.





Review of systems:


GEN.:  Tired


EYES: None


HEENT: Decreased scalp hair


NECK: None


RESPIRATORY: None


CARDIOVASCULAR: None


GASTROINTESTINAL: Nausea


GENITOURINARY: None


MUSCULOSKELETAL: Joint pains 50 the hands]


LYMPHATICS: None


HEMATOLOGICAL: None  


PSYCHIATRY: None


NEUROLOGICAL: None





Past medical history to include:


Invasive DrNirmal breast cancer, osteoarthritis, lumbar compression fracture





Social history:


No history of smoking or alcohol.  





Family history:


Prostate cancer, intracranial aneurysm, TB





Physical examination:


VITAL SIGNS: 97.3, 64, 16, 159 T 72, 92% room air


GENERAL: BMI 21.7, laying in bed, awake, tired.


EYES: Pupils equal.  Conjunctiva normal.


HEENT: External appearance of nose and ears normal, oral cavity grossly normal. 

Decreased scalp hair


CHEST wall: Dressing over the chest wall, with Ace wrap


NECK: JVD not raised; masses not palpable.


HEART: First and second heart sounds are normal;  no edema.  


LUNGS: Respiratory rate normal; clear to auscultation.  


ABDOMEN: Soft,  nontender, liver spleen not palpable, no masses palpable.  


PSYCH: Alert and oriented x3;  mood  and affect normal.  


NEUROLOGICAL: Cranial nerves grossly intact; no facial asymmetry,   power and 

sensation grossly intact. 


MUSCULAR skeletal: Evidence of OA especially in the hands


LYMPHATICS: No lymph nodes palpable in the   neck





Assessment and plan:





-Multifocal invasive ductal cancer of the left breast followed by left 

mastectomy with prior new adjuvant chemotherapy





-Alopecia secondary to chemotherapy





-Postoperative nausea secondary to pain medications


IV fluids and antiemetic as needed





-Primary osteoarthritis in multiple joints bilateral especially the hands


Tylenol as needed





-Chronic low back pain from arthritis


Pain medicine as needed





Care was discussed with the patient.  IV fluids.  Pain medications.  Diet 

advanced as tolerated.  Care was discussed with the patient.  Questions 

answered.


Thank you Dr. Jackson Chaidez





Past Medical History


Past Medical History: Cancer


Additional Past Medical History / Comment(s): left breast invasive carcinoma 

02/16/21;with chemo last tx 07/12/21.  chronic uti's; shingles bladder & rectum 

( November 2020) ; osteoporosis., hx anemia, states possible blood cancer 

(waldenstrom macroglobulinemia) -Dr Khan will do testing after breast surgery.,

lower back/tailbone compressed discs.


History of Any Multi-Drug Resistant Organisms: None Reported


Past Surgical History: Breast Surgery


Additional Past Surgical History / Comment(s): left breast malignant biopsy 

02/16/21; benign right breast biopsy 2017; approx late 1970's/early 1980's left 

breast excisional biopsy;


Past Anesthesia/Blood Transfusion Reactions: Previous Problems w/ Anesthesia


Additional Past Anesthesia/Blood Transfusion Reaction / Comm: took along time to

wake up.


Past Psychological History: Anxiety


Smoking Status: Never smoker


Past Alcohol Use History: None Reported


Past Drug Use History: None Reported





- Past Family History


  ** Father


Family Medical History: Cancer, Prostate Disorder


Additional Family Medical History / Comment(s): Prostate cancer; intracrainal 

aneurysm; history of TB.





  ** Mother


Family Medical History: Cancer, Congestive Heart Failure (CHF), Myocardial 

Infarction (MI)


Additional Family Medical History / Comment(s): Lung cancer;





  ** Brother(s)


Family Medical History: Cancer


Additional Family Medical History / Comment(s): prostate cancer





Medications and Allergies


                                Home Medications











 Medication  Instructions  Recorded  Confirmed  Type


 


No Known Home Medications  08/30/21 08/30/21 History








                                    Allergies











Allergy/AdvReac Type Severity Reaction Status Date / Time


 


Sulfa (Sulfonamide Allergy  Abdominal Verified 09/07/21 17:55





Antibiotics)   Pain  














Physical Exam


Vitals: 


                                   Vital Signs











  Temp Pulse Pulse Resp BP Pulse Ox


 


 09/07/21 21:58    57 L   123/60  96


 


 09/07/21 19:44    69  16  136/67  93 L


 


 09/07/21 19:14    69  16  143/69  93 L


 


 09/07/21 18:44    59 L  16  149/62  94 L


 


 09/07/21 18:14    64  16  159/72  92 L


 


 09/07/21 17:59    62  16  149/74  91 L


 


 09/07/21 17:50  97.3 F L   77  18  160/75  94 L


 


 09/07/21 17:12   78   16  161/75  99


 


 09/07/21 16:57   69   16  149/71  99


 


 09/07/21 16:42   72   16  153/71  100


 


 09/07/21 16:27   60   16  153/71  100


 


 09/07/21 16:13   58 L   16  105/52  97


 


 09/07/21 15:58  96.8 F L  64   16  106/51  96


 


 09/07/21 13:14    78  16  151/67  95


 


 09/07/21 12:14  98.1 F   71  16  157/70  98








                                Intake and Output











 09/07/21 09/07/21 09/07/21





 06:59 14:59 22:59


 


Intake Total  500 1180


 


Output Total   105


 


Balance  500 1075


 


Intake:   


 


  IV  500 1150


 


  Oral   30


 


Output:   


 


  Urine   100


 


  Estimated Blood Loss   5


 


Other:   


 


  Voiding Method   Toilet


 


  Weight  47 kg

## 2021-09-07 NOTE — P.OP
Date of Procedure: 09/07/21


Preoperative Diagnosis: 


Left breast multifocal carcinoma


Postoperative Diagnosis: 


Same


Procedure(s) Performed: 


Left mastectomy, sentinel node mapping, sentinel node biopsy, resection of 

superficial axillary tissue


Anesthesia: ISRA


Surgeon: Lore Berry


Estimated Blood Loss (ml): 45


IV fluids (ml): 500


Pathology: other (Breast tissue, axillary tissue, sentinel lymph nodes)


Condition: stable


Disposition: same day


Indications for Procedure: 


Left breast multifocal carcinoma


Operative Findings: 


Fibrofatty left breast tissue, sentinel lymph nodes 3


Description of Procedure: 


     Lizzy is a 71-year-old white female who was diagnosed with multifocal 

left breast invasive ductal carcinoma.  After discussion she opted for left 

breast mastectomy.  She underwent neoadjuvant chemotherapy.  Secondary to the 

neoadjuvant chemotherapy to tracer was placed for sentinel node evaluation.  

Prior to coming to the operating room radioactive tracer was injected.  The 

patient was brought to the operating room and following induction of anesthesia 

the neoprobe was used to evaluate the axilla.  Increased radioactivity was 

identified.  Additionally 10 mL of half-strength methylene blue was injected in 

the periareolar area and the breast was massaged for 3 minutes.  The breast and 

axilla were then prepped and draped in a sterile fashion.


The mastectomy was performed initially.  Superior skin flap was developed using 

the electrocautery device as well as the Harmonic scalpel.  Following this the 

inferior skin flap was developed.  The breast was removed from medial to lateral

being careful to maintain hemostasis using the electrocautery device as well as 

the Harmonic scalpel.  In the axillary border the breast was removed.  The 

axilla was then evaluated.  Using the neoprobe and following the lymphatic 3 

lymph nodes were identified.  The first had a 10 second count of 192-year-old, 

the second a 10 second count of 960, and a third 10 second count of 5379.  The 

background radioactivity after removal of these nodes was 79.  No additional 

radioactive or blue lymph nodes were identified.  The lymph node with the 

highest radioactive count was sent for frozen section evaluation and this was 

negative for metastatic disease.


     Evaluation of the axilla revealed the thoracodorsal and long thoracic 

nerves as well as the axillary vein.  The axillary tissues were very minimal in 

this area and  had been swept inferiorly and were removed.  No additional lymph 

nodes of concern were identified.





2 MORGAN drains were placed.  These were secured in place using nylon suture.  The 

subcutaneous tissue was closed using 3-0 Vicryl suture.  This is followed by 

closure of the skin with staples.  The patient tolerated the procedure in stable

condition.  All instruments and sponge counts were correct at the end of the 

case.

## 2021-09-07 NOTE — NM
EXAMINATION TYPE: NM sentinel node injection

 

DATE OF EXAM: 9/7/2021

 

COMPARISON: NONE

 

INDICATION: Abnormal mammogram.

 

Informed consent was obtained.  A timeout was performed.  

 

The area around the left nipple was cleansed with alcohol.   In a single dose, a total of 508 uCi Jeffery
hnetium 99m Tilmanocept was injected.  The patient tolerated the procedure very well.  

 

IMPRESSIONS:

 

1. Successful injection for sentinel node evaluation.

## 2021-09-07 NOTE — P.NAPBC
NAPBC Queries





- NAPBC Queries


Was patient's case review presented at [facility] tumor board? If no, comment.: Yes


Was patient's pathology reviewed at [facility]? If no, comment.: Yes


Was breast conservation surgery offered? If no, comment.: No (multifocal 

disease)


Was sentinel node biopsy offered? If no, comment.: Yes ( patient status post 

neoadjuvant therapy and will use dual tracer)


Was diagnosis confirmed by percutaneous core biopsy? If no, comment.: Yes


Is patient mastectomy patient?: Yes


Was a preop referral to reconstructive surgeon offered?: No


Clinical Stage: 





stage IA multifocal left breast cancer

## 2021-09-08 LAB
BASOPHILS # BLD AUTO: 0 K/UL (ref 0–0.2)
BASOPHILS NFR BLD AUTO: 0 %
EOSINOPHIL # BLD AUTO: 0.1 K/UL (ref 0–0.7)
EOSINOPHIL NFR BLD AUTO: 1 %
ERYTHROCYTE [DISTWIDTH] IN BLOOD BY AUTOMATED COUNT: 3.07 M/UL (ref 3.8–5.4)
ERYTHROCYTE [DISTWIDTH] IN BLOOD: 12.4 % (ref 11.5–15.5)
HCT VFR BLD AUTO: 30.6 % (ref 34–46)
HGB BLD-MCNC: 10.1 GM/DL (ref 11.4–16)
LYMPHOCYTES # SPEC AUTO: 1.6 K/UL (ref 1–4.8)
LYMPHOCYTES NFR SPEC AUTO: 21 %
MCH RBC QN AUTO: 32.9 PG (ref 25–35)
MCHC RBC AUTO-ENTMCNC: 32.9 G/DL (ref 31–37)
MCV RBC AUTO: 99.9 FL (ref 80–100)
MONOCYTES # BLD AUTO: 0.5 K/UL (ref 0–1)
MONOCYTES NFR BLD AUTO: 7 %
NEUTROPHILS # BLD AUTO: 5.4 K/UL (ref 1.3–7.7)
NEUTROPHILS NFR BLD AUTO: 70 %
PLATELET # BLD AUTO: 191 K/UL (ref 150–450)
WBC # BLD AUTO: 7.7 K/UL (ref 3.8–10.6)

## 2021-09-08 RX ADMIN — HEPARIN SODIUM SCH UNIT: 5000 INJECTION INTRAVENOUS; SUBCUTANEOUS at 13:35

## 2021-09-08 RX ADMIN — POTASSIUM CHLORIDE SCH MLS/HR: 14.9 INJECTION, SOLUTION INTRAVENOUS at 03:02

## 2021-09-08 RX ADMIN — HEPARIN SODIUM SCH UNIT: 5000 INJECTION INTRAVENOUS; SUBCUTANEOUS at 03:03

## 2021-09-08 RX ADMIN — POTASSIUM CHLORIDE SCH: 14.9 INJECTION, SOLUTION INTRAVENOUS at 20:10

## 2021-09-08 RX ADMIN — Medication SCH MG: at 22:20

## 2021-09-08 RX ADMIN — HEPARIN SODIUM SCH UNIT: 5000 INJECTION INTRAVENOUS; SUBCUTANEOUS at 21:06

## 2021-09-08 RX ADMIN — MORPHINE SULFATE PRN MG: 4 INJECTION, SOLUTION INTRAMUSCULAR; INTRAVENOUS at 03:03

## 2021-09-08 NOTE — P.PN
Progress Note - Text


Progress Note Date: 09/08/21





- Chief Complaint


Breast surgery





Consultation:


This is a pleasant 71-year-old patient who follows that Dr. SANTHOSH Pina.  Patient was

diagnosed with multifocal left breast invasive ductal carcinoma.  Patient 

decided to go for left mastectomy.  She underwent neoadjuvant chemotherapy.  

Patient underwent left mastectomy sentinel node mapping and sentinel node biopsy

resection of superficial axillary tissue.  She has some pain at the operative 

site.  Also some nausea earlier.  On clear liquids. has arthritic pain in the 

joints.  Has decreased scalp hair from chemotherapy.


September 8: Doing much better.  tolerated  breakfast.  No nausea vomiting.  

Pain is controlled.  Has 2 MORGAN drains.  Looking forward to go home.  Has been up 

to the bathroom.  Hemoglobin is 10.2.  Surgeon is decided to watch it for 

another 24 hours.





Review of systems: Was done for constitutional, cardiovascular, GI, pulmonary. 

relevant finding as above





Active Medications





Heparin Sodium (Porcine) (Heparin Sodium,Porcine/Pf 5,000 Unit/0.5 Ml Syringe)  

5,000 unit SQ Q8H [facility]


   Last Admin: 09/08/21 21:06 Dose:  5,000 unit


   Documented by: 


Dextrose/Sodium Chloride (Dextrose 5%-1/2ns Iv Soln)  1,000 mls @ 75 mls/hr IV 

.[id number] [facility]


   Stop: 10/07/21 16:01


   Last Admin: 09/08/21 20:10 Dose:  Not Given


   Documented by: 


Lidocaine HCl (Lidocaine 1% (10mg/Ml) For Iv Start)  0.1 ml INTRADERMA PER 

PROTOCOL PRN


   PRN Reason: IV Start


   Stop: 10/07/21 05:38


Morphine Sulfate (Morphine Sulfate 4 Mg/Ml Syringe)  4 mg IV Q4HR PRN


   PRN Reason: Severe Pain


   Stop: 10/07/21 15:54


   Last Admin: 09/08/21 03:03 Dose:  4 mg


   Documented by: 


Naloxone HCl (Naloxone 0.4 Mg/Ml 1 Ml Vial)  0.2 mg IV Q2M PRN


   PRN Reason: Opioid Reversal


   Stop: 10/07/21 15:54


Ondansetron HCl (Ondansetron 4 Mg/2 Ml Vial)  4 mg IVP Q8HR PRN


   PRN Reason: Nausea And Vomiting


   Stop: 10/07/21 15:54











Past medical history to include:


Invasive DrNirmal breast cancer, osteoarthritis, lumbar compression fracture





Social history:


No history of smoking or alcohol.  





Family history:


Prostate cancer, intracranial aneurysm, TB





Physical examination:


VITAL SIGNS: 98, 74, 16, 125-61, 97% room air


GENERAL: BMI 21.7, laying in bed, awake, tired. 


EYES: Pupils equal.  Conjunctiva normal.


HEENT:  Decreased scalp hair


CHEST wall: Dressing over the chest wall, with Ace wrap.  MORGAN drain 2


NECK: JVD not raised; masses not palpable.


HEART: First and second heart sounds are normal;  no edema.  


LUNGS: Respiratory rate normal; clear to auscultation.  


ABDOMEN: Soft,  nontender, liver spleen not palpable, no masses palpable.  


PSYCH: Alert and oriented x3;  mood  and affect normal.   


MUSCULAR skeletal: Evidence of OA especially in the hands





Investigations:


Hemoglobin 10.1 white count 7.7 platelets 91








Assessment and plan:





-Multifocal invasive ductal cancer of the left breast followed by left 

mastectomy with prior new adjuvant chemotherapy





-Alopecia secondary to chemotherapy





-Postoperative nausea secondary to pain medications: Improved


IV fluids and antiemetic as needed





-Primary osteoarthritis in multiple joints bilateral especially the hands


Tylenol as needed





-Chronic low back pain from arthritis


Pain medicine as needed





-Acute postprocedure blood loss anemia expected from surgery


Follow H&H





Doing much better.  He is being held by surgery for another 24 hours to repeat 

hemoglobin.  Care was discussed with the patient.  Start iron supplement.


Thank you Dr. Jackson Chaidez

## 2021-09-09 VITALS
HEART RATE: 71 BPM | DIASTOLIC BLOOD PRESSURE: 64 MMHG | RESPIRATION RATE: 16 BRPM | SYSTOLIC BLOOD PRESSURE: 137 MMHG | TEMPERATURE: 97.3 F

## 2021-09-09 LAB
BASOPHILS # BLD AUTO: 0 K/UL (ref 0–0.2)
BASOPHILS NFR BLD AUTO: 1 %
EOSINOPHIL # BLD AUTO: 0.2 K/UL (ref 0–0.7)
EOSINOPHIL NFR BLD AUTO: 3 %
ERYTHROCYTE [DISTWIDTH] IN BLOOD BY AUTOMATED COUNT: 3.58 M/UL (ref 3.8–5.4)
ERYTHROCYTE [DISTWIDTH] IN BLOOD: 12.4 % (ref 11.5–15.5)
HCT VFR BLD AUTO: 36.4 % (ref 34–46)
HGB BLD-MCNC: 12 GM/DL (ref 11.4–16)
LYMPHOCYTES # SPEC AUTO: 1.3 K/UL (ref 1–4.8)
LYMPHOCYTES NFR SPEC AUTO: 24 %
MCH RBC QN AUTO: 33.6 PG (ref 25–35)
MCHC RBC AUTO-ENTMCNC: 33 G/DL (ref 31–37)
MCV RBC AUTO: 101.8 FL (ref 80–100)
MONOCYTES # BLD AUTO: 0.5 K/UL (ref 0–1)
MONOCYTES NFR BLD AUTO: 8 %
NEUTROPHILS # BLD AUTO: 3.6 K/UL (ref 1.3–7.7)
NEUTROPHILS NFR BLD AUTO: 63 %
PLATELET # BLD AUTO: 200 K/UL (ref 150–450)
WBC # BLD AUTO: 5.7 K/UL (ref 3.8–10.6)

## 2021-09-09 RX ADMIN — POTASSIUM CHLORIDE SCH: 14.9 INJECTION, SOLUTION INTRAVENOUS at 11:21

## 2021-09-09 RX ADMIN — HEPARIN SODIUM SCH UNIT: 5000 INJECTION INTRAVENOUS; SUBCUTANEOUS at 05:28

## 2021-09-09 RX ADMIN — MORPHINE SULFATE PRN MG: 4 INJECTION, SOLUTION INTRAMUSCULAR; INTRAVENOUS at 05:37

## 2021-09-09 RX ADMIN — Medication SCH MG: at 09:23

## 2021-09-09 NOTE — P.DS
Providers


Date of admission: 


09/09/21 07:20





Attending physician: 


Lore Berry





Consults: 





                                        





09/07/21 15:58


Consult Physician Routine 


   Consulting Provider: Vinicio James


   Consult Reason/Comments: medical managment


   Do you want consulting provider notified?: Yes











Primary care physician: 


Leora Pina





Health Concerns: 


[facility] [phone] 





Plan - Discharge Summary


Discharge Rx Participant: Yes


New Discharge Prescriptions: 


No Action


   No Known Home Medications 


Discharge Medication List





No Known Home Medications  08/30/21 [History]








Follow up Appointment(s)/Referral(s): 


[facility], [NON-STAFF] - 1 Week


Lore Berry MD [STAFF PHYSICIAN] - 1 Week


Activity/Diet/Wound Care/Special Instructions: 


Do not drive until seen by Dr. Paz


Teaching patient drain care


Patient may shower after 48 hours


Keep Ace wrap on at all times





Discharge Disposition: HOME WITH HOME HEALTH SERVICES

## 2021-09-09 NOTE — P.PN
Subjective


Progress Note Date: 09/09/21


Principal diagnosis: 





Postop day #2 left breast mastectomy





The patient is a 71-year-old white female postop day #2 left breast mastectomy. 

At this time she is doing well and is ready for discharge.





Objective





- Vital Signs


Vital signs: 


                                   Vital Signs











Temp  97.3 F L  09/09/21 08:06


 


Pulse  71   09/09/21 08:06


 


Resp  16   09/09/21 08:06


 


BP  137/64   09/09/21 08:06


 


Pulse Ox  96   09/09/21 08:06








                                 Intake & Output











 09/08/21 09/09/21 09/09/21





 18:59 06:59 18:59


 


Intake Total 500 900 


 


Output Total 65 50 


 


Balance 435 850 


 


Weight   47 kg


 


Intake:   


 


  Intake, IV Titration  900 





  Amount   


 


    Dextrose 5%-0.45% NaCl 1,  900 





    000 ml @ 75 mls/hr IV .   





    [id number] QUIN Rx#:[id number]   


 


  Oral 500  


 


Output:   


 


  Drainage 65 50 


 


    #1 45 25 


 


    #2 20 25 


 


Other:   


 


  Voiding Method  Toilet 


 


  # Voids 1 3 1














- Constitutional


General appearance: Present: cooperative





- EENT


Eyes: Present: EOMI


ENT: Present: hearing grossly normal





- Neck


Neck: Present: normal ROM





- Respiratory


Respiratory: bilateral: CTA





- Cardiovascular


Heart sounds: normal: S1, S2





- Integumentary


Integumentary Comment(s): 





Incision clean and dry


MORGAN drains serous output


Total 15 mL





- Musculoskeletal


Musculoskeletal: Present: gait normal





- Psychiatric


Psychiatric: Present: A&O x's 3, appropriate affect, intact judgment & insight





- Labs


CBC & Chem 7: 


                                 09/08/21 08:00








Assessment and Plan


Assessment: 





Impression/plan:


1.  Patient postop day #2 left breast mastectomy doing well at this time plan 

for discharge to be followed by Dr. Paz next week

## 2021-09-10 NOTE — P.PN
Progress Note - Text


Progress Note Date: 09/09/21





- Chief Complaint


Breast surgery





Consultation:


This is a pleasant 71-year-old patient who follows that Dr. SANTHOSH Pina.  Patient was

diagnosed with multifocal left breast invasive ductal carcinoma.  Patient 

decided to go for left mastectomy.  She underwent neoadjuvant chemotherapy.  

Patient underwent left mastectomy sentinel node mapping and sentinel node biopsy

resection of superficial axillary tissue.  She has some pain at the operative 

site.  Also some nausea earlier.  On clear liquids. has arthritic pain in the 

joints.  Has decreased scalp hair from chemotherapy.


September 8: Doing much better.  tolerated  breakfast.  No nausea vomiting.  

Pain is controlled.  Has 2 MORGAN drains.  Looking forward to go home.  Has been up 

to the bathroom.  Hemoglobin is 10.2.  Surgeon is decided to watch it for 

another 24 hours.


September 9: Doing well.  Oral intake good.  MORGAN drain in place.  Feeling well.  

Pain control.





Review of systems: Was done for constitutional, cardiovascular, GI, pulmonary. 

relevant finding as above





Current medications reviewed in today's electronic records





Past medical history to include:


Invasive DrNirmal breast cancer, osteoarthritis, lumbar compression fracture





Social history:


No history of smoking or alcohol.  





Family history:


Prostate cancer, intracranial aneurysm, TB





Physical examination:


VITAL SIGNS: 97.3, 71, 16, 1 37 x 64, 96% room air


GENERAL: laying in bed, awake, tired. 


EYES: Pupils equal.  Conjunctiva normal.


HEENT:  Decreased scalp hair


CHEST wall: Dressing over the chest wall, with Ace wrap.  MORGAN drain 2


NECK: JVD not raised; masses not palpable.


HEART: First and second heart sounds are normal;  no edema.  


LUNGS: Respiratory rate normal; clear to auscultation.     


MUSCULAR skeletal: Evidence of OA especially in the hands





Investigations:


September 10: Hemoglobin 12


Hemoglobin 10.1 white count 7.7 platelets 91








Assessment and plan:





-Multifocal invasive ductal cancer of the left breast followed by left 

mastectomy with prior new adjuvant chemotherapy





-Alopecia secondary to chemotherapy





-Postoperative nausea secondary to pain medications: Improved


IV fluids and antiemetic as needed





-Primary osteoarthritis in multiple joints bilateral especially the hands


Tylenol as needed





-Chronic low back pain from arthritis


Pain medicine as needed





-Acute postprocedure blood loss anemia expected from surgery


Follow H&H





Stable.  Doing well.  She to follow-up with PCP.  Take iron supplement..


Thank you Dr. Jackson Chaidez

## 2021-09-16 ENCOUNTER — HOSPITAL ENCOUNTER (OUTPATIENT)
Dept: HOSPITAL 47 - WWCWWP | Age: 71
Discharge: HOME | End: 2021-09-16
Attending: SURGERY
Payer: MEDICARE

## 2021-09-16 VITALS
RESPIRATION RATE: 16 BRPM | HEART RATE: 77 BPM | SYSTOLIC BLOOD PRESSURE: 138 MMHG | DIASTOLIC BLOOD PRESSURE: 65 MMHG | TEMPERATURE: 97.7 F

## 2021-09-16 DIAGNOSIS — Z53.9: Primary | ICD-10-CM

## 2021-09-16 NOTE — P.PN
Progress Note - Text


Progress Note Date: 09/16/21





Lizzy is a 71-year-old white female status post left breast mastectomy and 

sentinel node biopsy and [address]86.  All sentinel nodes were negative for 

metastatic disease.  The mastectomy specimen revealed multifocal invasive 

moderately differentiated ductal carcinoma grade 2.  The patient's greatest 

dimension of invasive cancer measured 9 mm.  All margins were negative.








All lymph nodes were negative for tumor.





Physical examination:


Lungs clear:


Heart: Regular rate and rhythm


Incision: Clean and dry


MORGAN output less than 40 mL per day for each drain serous in nature





There is a small amount of redundant skin medially however the patient states 

this does not bother her





Impression:


Patient doing well post mastectomy


Pathology all margins negative





Plan:


Follow-up with medical oncology


Follow-up in 4 months


Staples and drains to be removed today





CC: Dr. Pina

## 2021-09-23 ENCOUNTER — HOSPITAL ENCOUNTER (OUTPATIENT)
Dept: HOSPITAL 47 - WWCWWP | Age: 71
Discharge: HOME | End: 2021-09-23
Attending: SURGERY
Payer: MEDICARE

## 2021-09-23 VITALS
SYSTOLIC BLOOD PRESSURE: 137 MMHG | DIASTOLIC BLOOD PRESSURE: 68 MMHG | RESPIRATION RATE: 16 BRPM | TEMPERATURE: 98 F | HEART RATE: 75 BPM

## 2021-09-23 DIAGNOSIS — Z53.9: Primary | ICD-10-CM

## 2021-09-23 NOTE — P.PN
Progress Note - Text


Progress Note Date: 09/23/21








    left breast multifocal invasive ductal cancer [id number]+Her2-G2








Lizzy is a 71-year-old white female status post left breast mastectomy and 

sentinel node biopsy on 9721.  All sentinel nodes were negative for metastatic

disease.  The mastectomy specimen revealed multifocal invasive moderately 

differentiated ductal carcinoma grade 2.  The patient's greatest dimension of 

invasive cancer measured 9 mm.  All margins were negative.








All lymph nodes were negative for tumor.





Physical examination:


Incision: Clean and dry


small seroma


limited mobility of the left arm





There is a small amount of redundant skin medially however the patient states 

this does not bother her





Impression:


Patient doing well post mastectomy


Pathology all margins negative





Plan:


Follow-up with medical oncology


Follow-up in 4 months


Staples to be removed


right breast mammogram in Feb. 2022


patient given exercises for left arm, she will with any concerns, she has 

declined physical therapy at this time





CC: Dr. Pina

## 2022-01-28 ENCOUNTER — HOSPITAL ENCOUNTER (OUTPATIENT)
Dept: HOSPITAL 47 - WWCWWP | Age: 72
Discharge: HOME | End: 2022-01-28
Attending: SURGERY
Payer: MEDICARE

## 2022-01-28 VITALS
TEMPERATURE: 97.8 F | HEART RATE: 78 BPM | RESPIRATION RATE: 18 BRPM | SYSTOLIC BLOOD PRESSURE: 149 MMHG | DIASTOLIC BLOOD PRESSURE: 67 MMHG

## 2022-01-28 DIAGNOSIS — Z53.9: Primary | ICD-10-CM

## 2022-01-28 NOTE — P.PN
Subjective


Progress Note Date: 01/28/22


Principal diagnosis: 





left breast invasive ductal cancer Stage IB [id number]+Her2-G2


   


     Lizzy is a 71 -year-old white female who initially presented with a 

palpable mass in her left breast.  She had noted this around October 2020.  

Diagnostic mammograms on February 2021 revealed a suspicious lesion in the upper

inner quadrant of the left breast posterior depth.  Ultrasound identified a 2.2 

x 2.5 cm mass.


On 26021 core biopsy of the left breast was positive for G2 invasive ductal 

carcinoma ER/GA positive HER-2/vu negative.  Bilateral breast MRI on 3921 

identified a suspicious 1.7 cm lesion at 10:00 left breast otherwise 

unremarkable.





Oncotype was performed which revealed a recurrence score of 28.  She underwent 

neoadjuvant chemotherapy TC and completed 4 cycles and 38464.  On 97021 she 

had a left mastectomy pathology revealed residual multifocal invasive cancer 

largest measuring 9 mm and negative lymph nodes.





She started adjuvant Arimidex she is also on Fosamax at this time.  She did not 

have any radiation therapy.  The patient does not feel any new lumps masses or 

nodules of concern.





She was found to have persistent elevated total protein on a CMP which revealed 

elevated IgM Monoclonal protein. She is going to have a bone marrow biopsy.





She is due for a right breast mammogram in February 2022.








Note fromtruong Khan 12-6-21 reviewed





Caffeine:none


nicotine: none


arron-bromine: occasional


hormones: none





Family History: 


mother: lung cancer smoker


paternal aunt: ? type


father: prostate cancer


brother: prostate ancer





Hormonal History:


menarche: 12


G0


menopause: 49


BCP: none


hormones: none








Surgical History:


breast biopsy


left mastectomy and SNB





Medical History:


shingles


bladder infection


monoclonal IMG elevation





Social History:


smoke: none


alcohol: none


drugs: none





 











- Constitutional


Constitutional: Denies chills, Denies fever





- EENT


Comment: 





wears glasses


Ears: bilateral: tinnitus


Ears, nose, mouth and throat: Denies headache, Denies sore throat





- Breasts


Breasts: bilateral: as per HPI





- Cardiovascular


Cardiovascular: Denies chest pain, Denies shortness of breath





- Respiratory


Respiratory: Denies cough





- Gastrointestinal


Gastrointestinal: Denies abdominal pain, Denies diarrhea, Denies nausea, Denies 

vomiting





- Genitourinary (Female)


Comment: 





UTI in recent past


Genitourinary: Denies dysuria, Denies hematuria





- Menstruation


Menstruation: Reports postmenopausal





- Musculoskeletal


Comment: 





arthritis in hands





- Integumentary


Integumentary: Denies pruritus, Denies rash





- Neurological


Neurological: Denies numbness, Denies weakness





- Psychiatric


Psychiatric: Reports anxiety





- Endocrine


Endocrine: Denies fatigue, Denies weight change





- Hematologic/Lymphatic


Comment: 





low dose aspirin and vitamin E





- Allergic/Immunologic


Allergic/Immunologic: Reports as per HPI














Objective





- Vital Signs


Vital signs: 


                                   Vital Signs











Temp  97.8 F   01/28/22 10:21


 


Pulse  78   01/28/22 10:21


 


Resp  18   01/28/22 10:21


 


BP  149/67   01/28/22 10:21


 


Pulse Ox      








                                 Intake & Output











 01/27/22 01/28/22 01/28/22





 18:59 06:59 18:59


 


Weight   46.266 kg














- Exam





BMI 21.3





- Constitutional


General appearance: Present: cooperative





- EENT


Eyes: Present: EOMI


ENT: Present: hearing grossly normal





- Neck


Neck: Present: normal ROM





- Respiratory


Respiratory: bilateral: CTA





- Cardiovascular


Heart sounds: normal: S1, S2





- Integumentary


Integumentary: Present: normal turgor





- Musculoskeletal


Musculoskeletal: Present: gait normal





- Psychiatric


Psychiatric: Present: A&O x's 3, appropriate affect, intact judgment & insight





- Additional findings


Additional findings: 





Breast Exam:


BRA: 36A


inspection: Right breast grade 3 ptosis, left chest wall incision no evidence of

recurrence


Palpation:


Right breast: Ultimate positional exam no dominant masses or nodules of concern


Right axilla: No adenopathy of concern


Left chest wall: No evidence of recurrent cancer


Left axilla: No adenopathy of concern





Assessment and Plan


Assessment: 





Impression:


shingles


bladder infection


monoclonal Ig elevation


left breast stage Ib invasive ductal cancer, no evidence or recurrence





Plan:


Patient is due for a right breast mammogram


Elevated IgM bone marrow as per Dr. Pearce


follow up after mammogram





CC: Dr. Pina

## 2022-02-23 ENCOUNTER — HOSPITAL ENCOUNTER (OUTPATIENT)
Dept: HOSPITAL 47 - RADMAMWWP | Age: 72
Discharge: HOME | End: 2022-02-23
Attending: SURGERY
Payer: MEDICARE

## 2022-02-23 DIAGNOSIS — Z85.3: Primary | ICD-10-CM

## 2022-02-23 PROCEDURE — 77065 DX MAMMO INCL CAD UNI: CPT

## 2022-02-23 PROCEDURE — 77061 BREAST TOMOSYNTHESIS UNI: CPT

## 2022-02-25 ENCOUNTER — HOSPITAL ENCOUNTER (OUTPATIENT)
Dept: HOSPITAL 47 - WWCWWP | Age: 72
Discharge: HOME | End: 2022-02-25
Attending: SURGERY
Payer: MEDICARE

## 2022-02-25 VITALS
HEART RATE: 67 BPM | TEMPERATURE: 98.5 F | SYSTOLIC BLOOD PRESSURE: 132 MMHG | DIASTOLIC BLOOD PRESSURE: 74 MMHG | RESPIRATION RATE: 16 BRPM

## 2022-02-25 DIAGNOSIS — Z85.3: Primary | ICD-10-CM

## 2022-02-28 NOTE — MM
Reason for exam: additional evaluation requested from prior study.

Last mammogram was performed 7 months ago.



History:

Patient has history of breast cancer at age 70, history of other cancer, and is 

nulliparous.

Mastectomy of the left breast, September 7, 2021.  Malignant US biopsy breast VAD 

LT of the left breast, April 1, 2021.  Malignant ultrasound-guided core biopsy of 

the left breast, February 2021.  Benign excisional biopsy of the right breast, 

2017.  Benign excisional biopsy of the left breast.

Taking antineoplastic for 3 months.



Physical Findings:

Nurse did not find any significant physical abnormalities on exam.



MG 3D Diag Mammo W/Cad RT

CC, MLO, and XCCL view(s) were taken of the right breast.

Prior study comparison: August 4, 2021, left breast MG 3d diag mammo w/cad LT.  

April 1, 2021, left breast MG diagnostic mammo LT wo CAD.  February 16, 2021, 

mammogram.  February 3, 2021, mammogram.  June 8, 2018, mammogram, performed at 

[facility].  December 8, 2017, mammogram, performed at [facility].  November 17, 2017, mammogram, performed at [facility].  

October 20, 2017, mammogram, performed at [facility].

The breast tissue is heterogeneously dense. This may lower the sensitivity of 

mammography.  Stable benign calcifications. There is no discrete abnormality.

No significant new findings when compared with previous films.



These results were verbally communicated with the patient and result sheet given 

to the patient on 2/23/22.





ASSESSMENT: Benign, BI-RAD 2



RECOMMENDATION:

Routine screening mammogram of the right breast in 1 year.

## 2022-04-29 ENCOUNTER — HOSPITAL ENCOUNTER (OUTPATIENT)
Dept: HOSPITAL 47 - OR | Age: 72
Discharge: HOME | End: 2022-04-29
Attending: INTERNAL MEDICINE
Payer: MEDICARE

## 2022-04-29 VITALS — SYSTOLIC BLOOD PRESSURE: 145 MMHG | HEART RATE: 66 BPM | DIASTOLIC BLOOD PRESSURE: 70 MMHG

## 2022-04-29 VITALS — RESPIRATION RATE: 16 BRPM

## 2022-04-29 VITALS — BODY MASS INDEX: 21.9 KG/M2

## 2022-04-29 VITALS — TEMPERATURE: 97.5 F

## 2022-04-29 DIAGNOSIS — Z80.1: ICD-10-CM

## 2022-04-29 DIAGNOSIS — M81.0: ICD-10-CM

## 2022-04-29 DIAGNOSIS — Z90.12: ICD-10-CM

## 2022-04-29 DIAGNOSIS — Z88.2: ICD-10-CM

## 2022-04-29 DIAGNOSIS — Z17.0: ICD-10-CM

## 2022-04-29 DIAGNOSIS — Z79.899: ICD-10-CM

## 2022-04-29 DIAGNOSIS — C50.212: Primary | ICD-10-CM

## 2022-04-29 DIAGNOSIS — Z80.42: ICD-10-CM

## 2022-04-29 LAB
BASOPHILS # BLD AUTO: 0 K/UL (ref 0–0.2)
BASOPHILS NFR BLD AUTO: 1 %
EOSINOPHIL # BLD AUTO: 0.1 K/UL (ref 0–0.7)
EOSINOPHIL NFR BLD AUTO: 2 %
ERYTHROCYTE [DISTWIDTH] IN BLOOD BY AUTOMATED COUNT: 3.75 M/UL (ref 3.8–5.4)
ERYTHROCYTE [DISTWIDTH] IN BLOOD: 12.5 % (ref 11.5–15.5)
HCT VFR BLD AUTO: 35.7 % (ref 34–46)
HGB BLD-MCNC: 11.9 GM/DL (ref 11.4–16)
LYMPHOCYTES # SPEC AUTO: 1.3 K/UL (ref 1–4.8)
LYMPHOCYTES NFR SPEC AUTO: 24 %
MCH RBC QN AUTO: 31.6 PG (ref 25–35)
MCHC RBC AUTO-ENTMCNC: 33.2 G/DL (ref 31–37)
MCV RBC AUTO: 95 FL (ref 80–100)
MONOCYTES # BLD AUTO: 0.3 K/UL (ref 0–1)
MONOCYTES NFR BLD AUTO: 6 %
NEUTROPHILS # BLD AUTO: 3.6 K/UL (ref 1.3–7.7)
NEUTROPHILS NFR BLD AUTO: 66 %
PLATELET # BLD AUTO: 245 K/UL (ref 150–450)
WBC # BLD AUTO: 5.4 K/UL (ref 3.8–10.6)

## 2022-04-29 PROCEDURE — 85025 COMPLETE CBC W/AUTO DIFF WBC: CPT

## 2022-04-29 PROCEDURE — 38222 DX BONE MARROW BX & ASPIR: CPT

## 2022-04-29 PROCEDURE — 85045 AUTOMATED RETICULOCYTE COUNT: CPT

## 2022-04-29 NOTE — PCN
PROCEDURE NOTE



SURGICAL PROCEDURE:



DATE OF PROCEDURE:

04/29/2022



PROCEDURE:

Bone marrow aspirate and biopsy.



INDICATION:

Possible Waldenstrom macroglobulinemia.



PROCEDURE DESCRIPTION:

After obtaining consent from the patient, the procedure was performed in the endoscopy

suite under general anesthesia performed by the anesthesia team.  The patient was put

in the left lateral decubitus position.  The right posterior superior iliac crest was

localized. Skin was prepared with ChloraPrep. All sterile procedures were followed.

Two mL of 2% xylocaine was used for local anesthetic.  Monoject needle was inserted;

about 50 mL aspirate and 1 cm core biopsy was obtained without any difficulties.

Pressure was applied afterwards.  There was negligible blood loss.  The patient

tolerated the procedure very well without any immediate complications.





MMODL / IJN: [id number] / Job#: [id number]

## 2022-10-21 ENCOUNTER — HOSPITAL ENCOUNTER (OUTPATIENT)
Dept: HOSPITAL 47 - WWCWWP | Age: 72
Discharge: HOME | End: 2022-10-21
Attending: SURGERY
Payer: MEDICARE

## 2022-10-21 DIAGNOSIS — Z85.3: Primary | ICD-10-CM

## 2022-10-21 NOTE — P.PN
Subjective


Progress Note Date: 10/21/22


Principal diagnosis: 





left breast invasive ductal cancer


left breast invasive ductal cancer Stage IB [id number]+Her2-G2


   


     Lizzy is a 72 -year-old white female who initially presented with a 

palpable mass in her left breast.  She had noted this around October 2020.  

Diagnostic mammograms on February 2021 revealed a suspicious lesion in the upper

inner quadrant of the left breast posterior depth.  Ultrasound identified a 2.2 

x 2.5 cm mass.


On 26021 core biopsy of the left breast was positive for G2 invasive ductal 

carcinoma ER/NY positive HER-2/vu negative.  Bilateral breast MRI on 3921 

identified a suspicious 1.7 cm lesion at 10:00 left breast otherwise unrem

arkable.





Oncotype was performed which revealed a recurrence score of 28.  She underwent 

neoadjuvant chemotherapy TC and completed 4 cycles and 54430.  On 97021 she 

had a left mastectomy pathology revealed residual multifocal invasive cancer 

largest measuring 9 mm and negative lymph nodes.





She started adjuvant Arimidex she is also on Fosamax at this time.  She did not 

have any radiation therapy.  The patient does not feel any new lumps masses or 

nodules of concern.





She was found to have persistent elevated total protein on a CMP which revealed 

elevated IgM Monoclonal protein.  A bone biopsy was done on 4-29-22 which was + 

for 10% monoclonal B cell, it was noted to have Waldenstrom macroglobulinemia.  

She is not having any treatment for this. 





She is not concerned about any lumps masses or nodules in the right breast on 

the left chest wall.





She had a right 2-23-22 which was BIRAD 2. 








Note from 5-16-22 Dr. Khan reviewed





Caffeine:none


nicotine: none


arron-bromine: occasional


hormones: none





Family History: 


mother: lung cancer smoker


paternal aunt: ? type


father: prostate cancer


brother: prostate ancer





Hormonal History:


menarche: 12


G0


menopause: 49


BCP: none


hormones: none








Surgical History:


breast biopsy


left mastectomy and SNB


bone biopsy





Medical History:


shingles


bladder infection


monoclonal IMG elevation





Social History:


smoke: none


alcohol: none


drugs: none





 











- Constitutional


Constitutional: Denies chills, Denies fever





- EENT


Comment: 





wears glasses


Ears: bilateral: tinnitus


Ears, nose, mouth and throat: Denies headache, Denies sore throat





- Breasts


Breasts: bilateral: as per HPI





- Cardiovascular


Cardiovascular: Denies chest pain, Denies shortness of breath





- Respiratory


Respiratory: Denies cough





- Gastrointestinal


Gastrointestinal: Denies abdominal pain, Denies diarrhea, Denies nausea, Denies 

vomiting





- Genitourinary (Female)


Comment: 





UTI in recent past


Genitourinary: Denies dysuria, Denies hematuria





- Menstruation


Menstruation: Reports postmenopausal





- Musculoskeletal


Comment: 





arthritis in hands





- Integumentary


Integumentary: Denies pruritus, Denies rash





- Neurological


Neurological: Denies numbness, Denies weakness





- Psychiatric


Psychiatric: Reports anxiety





- Endocrine


Endocrine: Denies fatigue, Denies weight change





- Hematologic/Lymphatic


Comment: 





low dose aspirin and vitamin E





- Allergic/Immunologic


Allergic/Immunologic: Reports as per HPI




















Objective





- Constitutional


General appearance: Present: cooperative





- EENT


Eyes: Present: EOMI


ENT: Present: hearing grossly normal





- Neck


Neck: Present: normal ROM





- Respiratory


Respiratory: bilateral: CTA





- Cardiovascular


Rhythm: regular


Heart sounds: normal: S1, S2





- Gastrointestinal


General gastrointestinal: Present: soft





- Integumentary


Integumentary: Present: normal turgor





- Musculoskeletal


Musculoskeletal: Present: gait normal





- Psychiatric


Psychiatric: Present: A&O x's 3, appropriate affect, intact judgment & insight





- Additional findings


Additional findings: 


Breast Exam:


BRA: 36A


inspection: Right breast grade 3 ptosis, left chest wall incision no evidence of

recurrence


Palpation:


Right breast: multi positional exam no dominant masses or nodules of concern


Right axilla: No adenopathy of concern


Left chest wall: No evidence of recurrent cancer


Left axilla: No adenopathy of concern














Assessment and Plan


Assessment: 





Impression:


Left breast invasive ductal carcinoma, patient's completed 4 cycles of TC, she 

underwent a left breast mastectomy and sentinel node biopsy, no evidence of 

recurrent disease


Status post bone marrow biopsy 5222 positive for Wildenstein's 

macroglobulinemia








Plan:


Repeat right breast mammogram February 2023 with appointment at that time


Continue anti-hormone therapy as per medical oncology


Treatment for macroglobulinemia is being held at this time but continue to 

follow with medical oncology








nCC: Dr. Pina

## 2023-03-17 ENCOUNTER — HOSPITAL ENCOUNTER (OUTPATIENT)
Dept: HOSPITAL 47 - RADMAMWWP | Age: 73
Discharge: HOME | End: 2023-03-17
Attending: SURGERY
Payer: MEDICARE

## 2023-03-17 DIAGNOSIS — Z85.3: ICD-10-CM

## 2023-03-17 DIAGNOSIS — R92.2: Primary | ICD-10-CM

## 2023-03-17 PROCEDURE — 77061 BREAST TOMOSYNTHESIS UNI: CPT

## 2023-03-17 PROCEDURE — 77065 DX MAMMO INCL CAD UNI: CPT

## 2023-03-17 NOTE — MM
Reason for Exam: Additional evaluation requested from prior study. 

Last mammogram was performed 1 year(s) and 1 month(s) ago. 





Patient History: 

Menarche at age 11. Patient has no children. Breast cancer, left, age 70. Previous chemotherapy at

age 70. 02/2021, Malignant Ultrasound-Guided Core Biopsy on the left side. 09/07/2021, Mastectomy on

the Left side. Benign Excisional Biopsy on the left side. 2017, Benign Excisional Biopsy on the

right side. 4/1/2021, Malignant Core Biopsy on the left side. 





Prior Study Comparison: 

4/1/2021 Left Diagnostic Mammogram, [facility]. 8/4/2021 Left Diagnostic Mammogram, [facility]. 2/23/2022 Right

Diagnostic Mammogram, [facility]. 





Tissue Density: 

Right: The breast tissue is heterogeneously dense. This may lower the sensitivity of mammography.





Findings: 

Analyzed By CAD. 

Stable benign calcifications. No new significant finding from prior exam. Biopsy clip demonstrated

within the right breast. 





Overall Assessment: Benign, BI-RAD 2





Management: 

Screening Mammogram of the right breast in 1 year.

A clinical breast exam by your physician is recommended on an annual basis and results should be

correlated with mammographic findings.  This exam should not preclude additional follow-up of

suspicious palpable abnormalities.  Results were given to the patient verbally at the time of exam.



Electronically signed and approved by: Tim Greene D.O.

## 2023-04-07 ENCOUNTER — HOSPITAL ENCOUNTER (OUTPATIENT)
Dept: HOSPITAL 47 - WWCWWP | Age: 73
Discharge: HOME | End: 2023-04-07
Attending: SURGERY
Payer: MEDICARE

## 2023-04-07 VITALS
DIASTOLIC BLOOD PRESSURE: 66 MMHG | HEART RATE: 80 BPM | RESPIRATION RATE: 16 BRPM | TEMPERATURE: 97.8 F | SYSTOLIC BLOOD PRESSURE: 153 MMHG

## 2023-04-07 DIAGNOSIS — Z80.1: ICD-10-CM

## 2023-04-07 DIAGNOSIS — B02.9: ICD-10-CM

## 2023-04-07 DIAGNOSIS — C88.0: ICD-10-CM

## 2023-04-07 DIAGNOSIS — Z88.2: ICD-10-CM

## 2023-04-07 DIAGNOSIS — C50.912: Primary | ICD-10-CM

## 2023-04-07 DIAGNOSIS — N30.90: ICD-10-CM

## 2023-04-07 DIAGNOSIS — R76.8: ICD-10-CM

## 2023-04-07 DIAGNOSIS — Z90.12: ICD-10-CM

## 2023-04-07 NOTE — P.PN
Subjective


Progress Note Date: 04/07/23


Principal diagnosis: 


left breast invasive ductal cancer Stage IB [id number]/FL+Her2-G2, 2021





 10-21-22





left breast invasive ductal cancer Stage IB [id number]/FL+Her2-G2


   


     Lizzy is a 72 -year-old white female who initially presented with a 

palpable mass in her left breast.  She had noted this around October 2020.  

Diagnostic mammograms on February 2021 revealed a suspicious lesion in the upper

inner quadrant of the left breast posterior depth.  Ultrasound identified a 2.2 

x 2.5 cm mass.


On 2621 core biopsy of the left breast was positive for G2 invasive ductal 

carcinoma ER/FL positive HER-2/vu negative.  Bilateral breast MRI on 3921 

identified a suspicious 1.7 cm lesion at 10:00 left breast otherwise 

unremarkable.





Oncotype was performed which revealed a recurrence score of 28.  She underwent 

neoadjuvant chemotherapy TC and completed 4 cycles and 93304.  On 9721 she 

had a left mastectomy pathology revealed residual multifocal invasive cancer 

largest measuring 9 mm and negative lymph nodes.





She started adjuvant Arimidex she is also on Fosamax at this time.  She did not 

have any radiation therapy.  The patient does not feel any new lumps masses or 

nodules of concern.





She was found to have persistent elevated total protein on a CMP which revealed 

elevated IgM Monoclonal protein.  A bone biopsy was done on 4-29-22 which was + 

for 10% monoclonal B cell, it was noted to have Waldenstrom macroglobulinemia.  

She is not having any treatment for this. 





She is not concerned about any lumps masses or nodules in the right breast on 

the left chest wall.





She had a right 2-23-22 which was BIRAD 2. 

















Note from 5-16-22 Dr. Khan reviewed








4-7-23


 Right breast mammogram 61562  BIRADS 2.  She is not complaining of any new 

lumps, masses or nodules of concern in her breast.  She recently had a CT scan 

showing nodules in her lower left lung. Patient continues on anestrazole. 


 








Caffeine:none


nicotine: none


arron-bromine: occasional


hormones: none





Family History: 


mother: lung cancer smoker


paternal aunt: ? type


father: prostate cancer


brother: prostate ancer





Hormonal History:


menarche: 12


G0


menopause: 49


BCP: none


hormones: none








Surgical History:


breast biopsy


left mastectomy and SNB


bone biopsy





Medical History:


shingles


bladder infection


monoclonal IMG elevation


treated for tachycardia summer 2022





Social History:


smoke: none


alcohol: none


drugs: none





 











- Constitutional


Constitutional: Denies chills, Denies fever





- EENT


Comment: 





wears glasses


Ears: bilateral: tinnitus


Ears, nose, mouth and throat: Denies headache, Denies sore throat





- Breasts


Breasts: bilateral: as per HPI





- Cardiovascular


Cardiovascular: Denies chest pain, Denies shortness of breath





- Respiratory


Respiratory: Denies cough





- Gastrointestinal


Gastrointestinal: Denies abdominal pain, Denies diarrhea, Denies nausea, Denies 

vomiting





- Genitourinary (Female)


Comment: 





UTI in recent past


Genitourinary: Denies dysuria, Denies hematuria





- Menstruation


Menstruation: Reports postmenopausal





- Musculoskeletal


Comment: 





arthritis in hands





- Integumentary


Integumentary: Denies pruritus, Denies rash





- Neurological


Neurological: Denies numbness, Denies weakness





- Psychiatric


Psychiatric: Reports anxiety





- Endocrine


Endocrine: Denies fatigue, Denies weight change





- Hematologic/Lymphatic


Comment: 





low dose aspirin and vitamin E





- Allergic/Immunologic


Allergic/Immunologic: Reports as per HPI


























Objective





- Constitutional


General appearance: Present: cooperative





- EENT


Eyes: Present: EOMI


ENT: Present: hearing grossly normal





- Neck


Neck: Present: normal ROM





- Respiratory


Respiratory: bilateral: CTA





- Cardiovascular


Rhythm: regular


Heart sounds: normal: S1, S2





- Gastrointestinal


General gastrointestinal: Present: soft





- Integumentary


Integumentary: Present: normal turgor





- Musculoskeletal


Musculoskeletal: Present: gait normal





- Psychiatric


Psychiatric: Present: A&O x's 3, appropriate affect, intact judgment & insight





- Additional findings


Additional findings: 


Breast Exam:


BRA: 36A


inspection: Right breast grade 3 ptosis, left chest wall incision no evidence of

recurrence


Palpation:


Right breast: multi positional exam no dominant masses or nodules of concern


Right axilla: No adenopathy of concern


Left chest wall: No evidence of recurrent cancer


Left axilla: No adenopathy of concern











Assessment and Plan


Assessment: 


mpression:


Left breast invasive ductal carcinoma, patient's completed 4 cycles of TC, she 

underwent a left breast mastectomy and sentinel node biopsy, no evidence of 

recurrent disease


Status post bone marrow biopsy 5222 positive for Wildenstein's 

macroglobulinemia


right breast mammogram 3-17-23 BIRAD 2


CT scan of lungs following with medical 








Plan:


right breast mammogram 3-17-23 BIRAD 2, repeat in one year


Continue anti-hormone therapy as per medical oncology


Treatment for macroglobulinemia is being held at this time but continue to fo

llow with medical oncology








CC: Dr. Pina

## 2024-07-29 ENCOUNTER — HOSPITAL ENCOUNTER (OUTPATIENT)
Dept: HOSPITAL 47 - RADMAMWWP | Age: 74
Discharge: HOME | End: 2024-07-29
Attending: SURGERY
Payer: MEDICARE

## 2024-07-29 DIAGNOSIS — Z85.3: ICD-10-CM

## 2024-07-29 DIAGNOSIS — Z78.0: ICD-10-CM

## 2024-07-29 DIAGNOSIS — C50.912: Primary | ICD-10-CM

## 2024-07-29 DIAGNOSIS — R92.333: ICD-10-CM

## 2024-07-29 PROCEDURE — 77065 DX MAMMO INCL CAD UNI: CPT

## 2024-07-29 PROCEDURE — 77061 BREAST TOMOSYNTHESIS UNI: CPT

## 2024-08-27 NOTE — MM
Reason for Exam: Hx of breast cancer, conservation therapy. 

Last mammogram was performed 1 year(s) and 4 month(s) ago. 





Patient History: 

Menarche at age 11. Patient has no children. Postmenopausal. Breast cancer, left, age 70. Previous

chemotherapy at age 70. 02/2021, Malignant Ultrasound-Guided Core Biopsy on the left side.

09/07/2021, Mastectomy on the Left side. Benign Excisional Biopsy on the left side. 2017, Benign

Excisional Biopsy on the right side. 4/1/2021, Malignant Core Biopsy on the left side. 





Prior Study Comparison: 

8/4/2021 Left Diagnostic Mammogram, [facility]. 2/23/2022 Right Diagnostic Mammogram, [facility]. 3/17/2023 Right

MG 3D diag mammo w/cad RT, [facility]. 





Tissue Density: 

Right: The breasts are heterogeneously dense, which may obscure small masses.





Findings: 

Analyzed By CAD. 

No evidence of mass or distortion. No suspicious microcalcifications noted. 





Overall Assessment: Benign, BI-RAD 2





Management: 

Diagnostic Mammogram of the right breast in 1 year.

Results were given to the patient verbally at the time of exam.



Patient should continue monthly self-breast exams. A clinical breast exam by your physician is

recommended on an annual basis.

This exam should not preclude additional follow-up of suspicious palpable abnormalities.





Note on Mima scores and lifetime risk:

1.    A Mima score greater than 3% is considered moderate risk. If this is the case, consider

specialist referral to assess eligibility for a risk reducing agent.

2.    If overall lifetime risk for the development of breast cancer is 20% or higher, the patient

may qualify for future screening with alternating mammogram and breast MRI.



Electronically signed and approved by: Leopold M. Fregoli, M.D. Radiologis

## 2025-07-07 ENCOUNTER — HOSPITAL ENCOUNTER (OUTPATIENT)
Dept: HOSPITAL 47 - PROCWHC3 | Age: 75
End: 2025-07-07
Attending: INTERNAL MEDICINE
Payer: MEDICARE

## 2025-07-07 VITALS
DIASTOLIC BLOOD PRESSURE: 58 MMHG | RESPIRATION RATE: 16 BRPM | SYSTOLIC BLOOD PRESSURE: 159 MMHG | TEMPERATURE: 97.9 F | HEART RATE: 69 BPM

## 2025-07-07 DIAGNOSIS — C50.212: ICD-10-CM

## 2025-07-07 DIAGNOSIS — M81.0: Primary | ICD-10-CM

## 2025-07-07 PROCEDURE — 96372 THER/PROPH/DIAG INJ SC/IM: CPT

## 2025-07-07 RX ADMIN — DENOSUMAB NR MG: 60 INJECTION SUBCUTANEOUS at 13:00
